# Patient Record
Sex: FEMALE | Race: OTHER | Employment: FULL TIME | URBAN - METROPOLITAN AREA
[De-identification: names, ages, dates, MRNs, and addresses within clinical notes are randomized per-mention and may not be internally consistent; named-entity substitution may affect disease eponyms.]

---

## 2018-11-23 ENCOUNTER — OFFICE VISIT (OUTPATIENT)
Dept: INTERNAL MEDICINE CLINIC | Age: 28
End: 2018-11-23

## 2018-11-23 VITALS
OXYGEN SATURATION: 98 % | WEIGHT: 142.4 LBS | BODY MASS INDEX: 25.23 KG/M2 | HEART RATE: 72 BPM | TEMPERATURE: 98.2 F | RESPIRATION RATE: 20 BRPM | DIASTOLIC BLOOD PRESSURE: 83 MMHG | SYSTOLIC BLOOD PRESSURE: 124 MMHG

## 2018-11-23 DIAGNOSIS — L24.9 IRRITANT DERMATITIS: Primary | ICD-10-CM

## 2018-11-23 DIAGNOSIS — F43.21 ADJUSTMENT DISORDER WITH DEPRESSED MOOD: ICD-10-CM

## 2018-11-23 DIAGNOSIS — Z23 ENCOUNTER FOR IMMUNIZATION: ICD-10-CM

## 2018-11-23 NOTE — PROGRESS NOTES
ACUTE VISIT  
 
HPI:  
Freida Elias is a 34 y.o. female, she presents today for:  
 
Reports that she had a new sore throat on the 8th. Diagnosed with strep throat via rapid strep test on the 14th. Reports that she was prescribed cephalexin. Started having yeast infection symptoms during this time. \"cottage cheese\" like discharge. Also noted a new \"spot\" in vaginal area. Was seen at [facility] on 17th. Tested for yeast, gonorrhea, chlamydia and trichomonas. Then in the 18th tested by gyn with swab. Tested via blood for hsv1, hiv and syphilis. (blood work came back negative for all of the above). 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 On the 20th her period started, also noted new rash. Stopped valtrex on 28th. States that she did not have any pain at site of lesion in vaginal region. Also with some on thighs. Having rash on hands and arms, not itchy not painful. Overall is definitely improved compared to prior. Notes that she also had accidentally used a towel washed with tide. Has had prior reaction to tide. ROS: as noted above. Medications used for acute illness: as noted above. Current Outpatient Medications on File Prior to Visit Medication Sig  
 albuterol (PROVENTIL HFA, VENTOLIN HFA, PROAIR HFA) 90 mcg/actuation inhaler Take 2 Puffs by inhalation every four (4) hours as needed for Wheezing.  guaiFENesin SR (MUCINEX) 600 mg SR tablet Take 1 Tab by mouth two (2) times a day.  azithromycin (ZITHROMAX Z-JABARI) 250 mg tablet Take 2 now, then 1 daily for 4 days  chlorpheniramine-HYDROcodone (TUSSIONEX PENNKINETIC ER) 10-8 mg/5 mL suspension Take 5 mL by mouth every twelve (12) hours as needed for Cough. Max Daily Amount: 10 mL. No current facility-administered medications on file prior to visit. Allergies Allergen Reactions  Pcn [Penicillins] Rash PMH/PSH/FH: reviewed and updated Sochx: reports that  has never smoked. she has never used smokeless tobacco. She reports that she does not drink alcohol or use drugs. PE: 
Blood pressure 124/83, pulse 72, temperature 98.2 °F (36.8 °C), temperature source Oral, resp. rate 20, weight 142 lb 6.4 oz (64.6 kg), last menstrual period 11/19/2018, SpO2 98 %. Body mass index is 25.23 kg/m². Physical Exam  
Constitutional: She is oriented to person, place, and time. She appears well-developed and well-nourished. No distress. HENT:  
Head: Normocephalic. Mouth/Throat: Oropharynx is clear and moist.  
Eyes: Conjunctivae and EOM are normal.  
Neck: Neck supple. Cardiovascular: Normal rate, regular rhythm and normal heart sounds. Pulmonary/Chest: Effort normal and breath sounds normal.  
Neurological: She is alert and oriented to person, place, and time. Skin: Skin is warm and dry. Light papular rash on bilateral forearms scattered onto hands and onto upper arms. Nursing note and vitals reviewed. Labs: 
No results found for any visits on 11/23/18. A/P Lior Andrade was seen today for had concerns including Rash (pt noticed a rash on arms, hands and thighs on 11/19/18 and she denies sore throat). .  The diagnosis and plan was discussed including: ICD-10-CM ICD-9-CM 1. Irritant dermatitis L24.9 692.9 2. Encounter for immunization Z23 V03.89 INFLUENZA VIRUS VAC QUAD,SPLIT,PRESV FREE SYRINGE IM 3. Adjustment disorder with depressed mood F43.21 309.0 Irritant dermatitis: likely from tide pods. Discussed that allergic reaction to valacyclovir and or keflex could not be ruled out. Since she has finished keflex (for strep throat) and no longer have genital lesion, no change in therapy today. - if rash returns with use of either medication in the future would strongly consider allergic reaction.   
 
Depressed mood: in setting of new medical issues, and feeling isolated (lives alone). Provided therapeutic listening. Reassured patient that this is not an unusual reaction. Encouraged her to continue to reach out for social support, if low mood persists advised to follow with therapist or office.  
 
 
- I advised her to call back or return to office if symptoms worsen/change/persist. 
- She was given AVS and expressed understanding with the diagnosis and plan as discussed. Follow-up Disposition: 
Return if symptoms worsen or fail to improve.

## 2018-11-23 NOTE — PROGRESS NOTES
Room 2 Chief Complaint Patient presents with  Rash  
  pt noticed a rash on arms, hands and thighs on 11/19/18 and she denies sore throat 1. Have you been to the ER, urgent care clinic since your last visit? Hospitalized since your last visit? Yes When: seen at urgent care in [facility] and diagnosed with strep on 11/12/18 2. Have you seen or consulted any other health care providers outside of the [address] [place] [address] since your last visit? Include any pap smears or colon screening. No 
Health Maintenance Due Topic Date Due  Pneumococcal 19-64 Medium Risk (1 of 1 - PPSV23) 05/04/2011  PAP AKA CERVICAL CYTOLOGY  05/04/2013  Influenza Age 5 to Adult  08/01/2018 PHQ over the last two weeks 11/23/2018 Little interest or pleasure in doing things Several days Feeling down, depressed, irritable, or hopeless Several days Total Score PHQ 2 2 Pt stated she had protected sex with someone and she was testing for STDs via blood work and was all neg. Pt stated she noticed \"something\" down there and went to [facility] and was tested around 11/19/18. She was positive for the \"cold sore herpes\" that was called HSV1. She was taking Valtrex, pt said she stopped taking it on 11/21/18. Abuse Screening Questionnaire 11/23/2018 Do you ever feel afraid of your partner? Cosimo Blow Are you in a relationship with someone who physically or mentally threatens you? Cosimo Blow Is it safe for you to go home?  Claudette Dunks

## 2018-11-23 NOTE — PATIENT INSTRUCTIONS
Dermatitis: Care Instructions Your Care Instructions Dermatitis is the general name used for any rash or inflammation of the skin. Different kinds of dermatitis cause different kinds of rashes. Common causes of a rash include new medicines, plants (such as poison oak or poison ivy), heat, and stress. Certain illnesses can also cause a rash. An allergic reaction to something that touches your skin, such as latex, nickel, or poison ivy, is called contact dermatitis. Contact dermatitis may also be caused by something that irritates the skin, such as bleach, a chemical, or soap. These types of rashes cannot be spread from person to person. How long your rash will last depends on what caused it. Rashes may last a few days or months. Follow-up care is a key part of your treatment and safety. Be sure to make and go to all appointments, and call your doctor if you are having problems. It's also a good idea to know your test results and keep a list of the medicines you take. How can you care for yourself at home? · Do not scratch the rash. Cut your nails short, and file them smooth. Or wear gloves if this helps keep you from scratching. · Wash the area with water only. Pat dry. · Put cold, wet cloths on the rash to reduce itching. · Keep cool, and stay out of the sun. · Leave the rash open to the air as much as possible. · If the rash itches, use hydrocortisone cream. Follow the directions on the label. Calamine lotion may help for plant rashes. · Take an over-the-counter antihistamine, such as diphenhydramine (Benadryl) or loratadine (Claritin), to help calm the itching. Read and follow all instructions on the label. · If your doctor prescribed a cream, use it as directed. If your doctor prescribed medicine, take it exactly as directed. When should you call for help? Call your doctor now or seek immediate medical care if: 
  · You have symptoms of infection, such as: ? Increased pain, swelling, warmth, or redness. ? Red streaks leading from the area. ? Pus draining from the area. ? A fever.  
  · You have joint pain along with the rash.  
 Watch closely for changes in your health, and be sure to contact your doctor if: 
  · Your rash is changing or getting worse.  
  · You are not getting better as expected. Where can you learn more? Go to http://charlie-gabi.info/. Enter [phone] in the search box to learn more about \"Dermatitis: Care Instructions. \" Current as of: April 18, 2018 Content Version: 11.8 © 8672-1871 [employer]. Care instructions adapted under license by [employer] (which disclaims liability or warranty for this information). If you have questions about a medical condition or this instruction, always ask your healthcare professional. [address] any warranty or liability for your use of this information. Learning About Mindfulness for Stress What are mindfulness and stress? Stress is what you feel when you have to handle more than you are used to. A lot of things can cause stress. You may feel stress when you go on a job interview, take a test, or run a race. This kind of short-term stress is normal and even useful. It can help you if you need to work hard or react quickly. Stress also can last a long time. Long-term stress is caused by stressful situations or events. Examples of long-term stress include long-term health problems, ongoing problems at work, and conflicts in your family. Long-term stress can harm your health. Mindfulness is a focus only on things happening in the present moment. It's a process of purposefully paying attention to and being aware of your surroundings, your emotions, your thoughts, and how your body feels.  You are aware of these things, but you aren't judging these experiences as \"good\" or \"bad. \" Mindfulness can help you learn to calm your mind and body to help you cope with illness, pain, and stress. How does mindfulness help to relieve stress? Mindfulness can help quiet your mind and relax your body. Studies show that it can help some people sleep better, feel less anxious, and bring their blood pressure down. And it's been shown to help some people live and cope better with certain health problems like heart disease, depression, chronic pain, and cancer. How do you practice mindfulness? To be mindful is to pay attention, to be present, and to be accepting. · When you're mindful, you do just one thing and you pay close attention to that one thing. For example, you may sit quietly and notice your emotions or how your food tastes and smells. · When you're present, you focus on the things that are happening right now. You let go of your thoughts about the past and the future. When you dwell on the past or the future, you miss moments that can heal and strengthen you. You may miss moments like hearing a child laugh or seeing a friendly face when you think you're all alone. · When you're accepting, you don't  the present moment. Instead you accept your thoughts and feelings as they come. You can practice anytime, anywhere, and in any way you choose. You can practice in many ways. Here are a few ideas: · While doing your chores, like washing the dishes, let your mind focus on what's in your hand. What does the dish feel like? Is the water warm or cold? · Go outside and take a few deep breaths. What is the air like? Is it warm or cold? · When you can, take some time at the start of your day to sit alone and think. · Take a slow walk by yourself. Count your steps while you breathe in and out. · Try yoga breathing exercises, stretches, and poses to strengthen and relax your muscles. · At work, if you can, try to stop for a few moments each hour.  Note how your body feels. Let yourself regroup and let your mind settle before you return to what you were doing. · If you struggle with anxiety or \"worry thoughts,\" imagine your mind as a blue jenn and your worry thoughts as clouds. Now imagine those worry thoughts floating across your mind's jenn. Just let them pass by as you watch. Follow-up care is a key part of your treatment and safety. Be sure to make and go to all appointments, and call your doctor if you are having problems. It's also a good idea to know your test results and keep a list of the medicines you take. Where can you learn more? Go to http://charlie-gabi.info/. Enter W989 in the search box to learn more about \"Learning About Mindfulness for Stress. \" Current as of: June 29, 2018 Content Version: 11.8 © 9056-1044 [employer]. Care instructions adapted under license by [employer] (which disclaims liability or warranty for this information). If you have questions about a medical condition or this instruction, always ask your healthcare professional. [address] any warranty or liability for your use of this information.